# Patient Record
Sex: FEMALE | Race: WHITE | NOT HISPANIC OR LATINO | Employment: FULL TIME | URBAN - NONMETROPOLITAN AREA
[De-identification: names, ages, dates, MRNs, and addresses within clinical notes are randomized per-mention and may not be internally consistent; named-entity substitution may affect disease eponyms.]

---

## 2023-03-28 ENCOUNTER — HOSPITAL ENCOUNTER (EMERGENCY)
Facility: HOSPITAL | Age: 18
Discharge: HOME | End: 2023-03-28
Payer: COMMERCIAL

## 2023-03-28 ENCOUNTER — APPOINTMENT (OUTPATIENT)
Dept: GENERAL RADIOLOGY | Facility: HOSPITAL | Age: 18
End: 2023-03-28
Payer: COMMERCIAL

## 2023-03-28 VITALS
DIASTOLIC BLOOD PRESSURE: 86 MMHG | SYSTOLIC BLOOD PRESSURE: 127 MMHG | HEART RATE: 95 BPM | TEMPERATURE: 98 F | RESPIRATION RATE: 16 BRPM | WEIGHT: 136.69 LBS | OXYGEN SATURATION: 99 %

## 2023-03-28 DIAGNOSIS — M25.562 ACUTE PAIN OF LEFT KNEE: ICD-10-CM

## 2023-03-28 DIAGNOSIS — T14.8XXA ABRASION: ICD-10-CM

## 2023-03-28 PROCEDURE — G0463 HOSPITAL OUTPT CLINIC VISIT: HCPCS

## 2023-03-28 PROCEDURE — 250N000009 HC RX 250

## 2023-03-28 PROCEDURE — 99213 OFFICE O/P EST LOW 20 MIN: CPT

## 2023-03-28 PROCEDURE — 73562 X-RAY EXAM OF KNEE 3: CPT | Mod: LT

## 2023-03-28 RX ADMIN — Medication 3 ML: at 13:07

## 2023-03-28 ASSESSMENT — ENCOUNTER SYMPTOMS
ARTHRALGIAS: 1
CHILLS: 0
JOINT SWELLING: 1
VOMITING: 0
APPETITE CHANGE: 0
RHINORRHEA: 0
NUMBNESS: 0
ACTIVITY CHANGE: 1
DIARRHEA: 0
ABDOMINAL PAIN: 0
SHORTNESS OF BREATH: 0
COUGH: 0
FEVER: 0
NAUSEA: 0
WOUND: 1

## 2023-03-28 ASSESSMENT — ACTIVITIES OF DAILY LIVING (ADL): ADLS_ACUITY_SCORE: 35

## 2023-03-28 NOTE — DISCHARGE INSTRUCTIONS
Tylenol and ibuprofen as needed for pain.     Please return with any fevers, chills, redness, and drainage from wound.     Follow up with PCP as needed

## 2023-03-28 NOTE — ED PROVIDER NOTES
History     Chief Complaint   Patient presents with     Fall     Knee Pain     HPI  Maureen Velazquez is a 17 year old female who presents to the urgent care with complaints of left sided knee pain and abrasion to left knee after falling on the ice today. She did not hit head. She denies numbness/tingling. She does not take blood thinners. There is no uncontrolled bleeding. No previous injury to left leg/knee. She is able to bear weight and ambulate. Last tdap 2015, declines update today.     Allergies:  Allergies   Allergen Reactions     Bactrim [Sulfamethoxazole W/Trimethoprim]        Problem List:    Patient Active Problem List    Diagnosis Date Noted     Constipation 10/08/2013     Priority: Medium     Rectal bleeding 10/08/2013     Priority: Medium        Past Medical History:    No past medical history on file.    Past Surgical History:    No past surgical history on file.    Family History:    Family History   Problem Relation Age of Onset     Other - See Comments Mother         had to be hospitalized with strep throat once       Social History:  Marital Status:  Single [1]  Social History     Tobacco Use     Smoking status: Never     Smokeless tobacco: Never        Medications:    polyethylene glycol (MIRALAX) powder          Review of Systems   Constitutional: Positive for activity change. Negative for appetite change, chills and fever.   HENT: Negative for congestion and rhinorrhea.    Respiratory: Negative for cough and shortness of breath.    Gastrointestinal: Negative for abdominal pain, diarrhea, nausea and vomiting.   Musculoskeletal: Positive for arthralgias (left knee) and joint swelling.   Skin: Positive for wound (abrasions to left knee).   Neurological: Negative for numbness.   All other systems reviewed and are negative.      Physical Exam   BP: 127/86  Pulse: 95  Temp: 98  F (36.7  C)  Resp: 16  Weight: 62 kg (136 lb 11 oz)  SpO2: 99 %      Physical Exam  Vitals and nursing note reviewed.    Constitutional:       General: She is not in acute distress.     Appearance: Normal appearance. She is not ill-appearing.   Cardiovascular:      Rate and Rhythm: Normal rate and regular rhythm.      Pulses: Normal pulses.      Heart sounds: Normal heart sounds. No murmur heard.  Pulmonary:      Effort: No respiratory distress.      Breath sounds: Normal breath sounds. No stridor. No wheezing or rhonchi.   Musculoskeletal:         General: Swelling and tenderness present. No deformity.      Left knee: Swelling, laceration and bony tenderness present. No deformity, effusion, erythema, ecchymosis or crepitus. Decreased range of motion. Tenderness present over the MCL and LCL. Normal alignment and normal meniscus. Normal pulse.      Left lower leg: No swelling.      Left ankle: No swelling. No tenderness. Normal range of motion. Normal pulse.      Left Achilles Tendon: No tenderness.   Skin:     General: Skin is warm.      Capillary Refill: Capillary refill takes less than 2 seconds.      Coloration: Skin is not pale.      Findings: Abrasion (left knee) present. No erythema.   Neurological:      General: No focal deficit present.      Mental Status: She is alert and oriented to person, place, and time.         ED Course                 Procedures                Results for orders placed or performed during the hospital encounter of 03/28/23 (from the past 24 hour(s))   XR Knee Left 3 Views    Narrative    PROCEDURE: XR KNEE LEFT 3 VIEWS 3/28/2023 1:06 PM    HISTORY: tenderness to lateral and medial knee after fall    COMPARISONS: None.    TECHNIQUE: 3 views.    FINDINGS: No fracture, dislocation or other focal bony abnormality is  seen. There is no joint effusion or focal bone lesion.         Impression    IMPRESSION: No acute fracture.    SCARLET LENTZ MD         SYSTEM ID:  [id number]       Medications   lido-EPINEPHrine-tetracaine (LET) topical gel GEL (3 mLs Topical $Given 3/28/23 [id number])       Assessments &  Plan (with Medical Decision Making)     I have reviewed the nursing notes.    I have reviewed the findings, diagnosis, plan and need for follow up with the patient.  Maureen Velazquez is a 17 year old female who presents to the urgent care with complaints of left sided knee pain and abrasion to left knee after falling on the ice today. She did not hit head. She denies numbness/tingling. She does not take blood thinners. There is no uncontrolled bleeding. No previous injury to left leg/knee. She is able to bear weight and ambulate. Last tdap 2015, declines update today.     (T14.8XXA) Abrasion, (M25.562) Acute pain of left knee  Plan: Tylenol and ibuprofen as needed for pain. Please return with any fevers, chills, redness, and drainage from wound. Follow up with PCP as needed. Understanding verbalized by patient and grand mother.     MDM: xray: No fracture, dislocation or other focal bony abnormality is  seen. There is no joint effusion or focal bone lesion, per radiologist. She is able to bear weight and ambulate. CMS intact.   Maureen declined updated Tdap today, last in 2015, risk and benefits discussed and she continues to decline. LET gel applied and wound anesthetized. Wound cleansed with 500ml of sterile sale and pressure wash tip. No foreign bodies or debris found. There are no lacerations to repair with steri strip, glue, or sutures. Antibiotic ointment, non adherent gauze, and Kerlix applied. Instructed on returning with any signs of infection.         Discharge Medication List as of 3/28/2023  1:35 PM          Final diagnoses:   Abrasion   Acute pain of left knee       3/28/2023   [facility]     Carmencita Mead NP  03/28/23 [id number]

## 2023-03-28 NOTE — ED TRIAGE NOTES
Pt presents with c/o left knee/shin pain. Reports slipped outside at home. Not concerned about any breaks in the knee. Does have an abrasion. Hurts to move because of the abrasion. Denies any bleeding disorders or use of blood thinners. Last tdap was in 2015. Would not like to update right now.